# Patient Record
Sex: MALE | Race: AMERICAN INDIAN OR ALASKA NATIVE | Employment: FULL TIME | URBAN - NONMETROPOLITAN AREA
[De-identification: names, ages, dates, MRNs, and addresses within clinical notes are randomized per-mention and may not be internally consistent; named-entity substitution may affect disease eponyms.]

---

## 2017-03-31 ENCOUNTER — OFFICE VISIT - GICH (OUTPATIENT)
Dept: FAMILY MEDICINE | Facility: OTHER | Age: 42
End: 2017-03-31

## 2017-03-31 ENCOUNTER — HISTORY (OUTPATIENT)
Dept: FAMILY MEDICINE | Facility: OTHER | Age: 42
End: 2017-03-31

## 2017-03-31 DIAGNOSIS — Z00.00 ENCOUNTER FOR GENERAL ADULT MEDICAL EXAMINATION WITHOUT ABNORMAL FINDINGS: ICD-10-CM

## 2017-03-31 DIAGNOSIS — E03.8 OTHER SPECIFIED HYPOTHYROIDISM: ICD-10-CM

## 2017-03-31 DIAGNOSIS — I10 ESSENTIAL (PRIMARY) HYPERTENSION: ICD-10-CM

## 2017-03-31 DIAGNOSIS — F15.10 OTHER STIMULANT ABUSE, UNCOMPLICATED (H): ICD-10-CM

## 2017-03-31 DIAGNOSIS — F32.1 MAJOR DEPRESSIVE DISORDER, SINGLE EPISODE, MODERATE (H): ICD-10-CM

## 2017-03-31 DIAGNOSIS — E11.65 TYPE 2 DIABETES MELLITUS WITH HYPERGLYCEMIA (H): ICD-10-CM

## 2017-03-31 LAB
ANION GAP - HISTORICAL: 9 (ref 5–18)
BUN SERPL-MCNC: 14 MG/DL (ref 7–25)
BUN/CREAT RATIO - HISTORICAL: 16
CALCIUM SERPL-MCNC: 9.1 MG/DL (ref 8.6–10.3)
CHLORIDE SERPLBLD-SCNC: 102 MMOL/L (ref 98–107)
CO2 SERPL-SCNC: 26 MMOL/L (ref 21–31)
CREAT SERPL-MCNC: 0.87 MG/DL (ref 0.7–1.3)
ESTIMATED AVERAGE GLUCOSE: 223 MG/DL
GFR IF NOT AFRICAN AMERICAN - HISTORICAL: >60 ML/MIN/1.73M2
GLUCOSE SERPL-MCNC: 264 MG/DL (ref 70–105)
HEMOGLOBIN A1C MONITORING (POCT) - HISTORICAL: 9.4 % (ref 4–6.2)
POTASSIUM SERPL-SCNC: 4.1 MMOL/L (ref 3.5–5.1)
SODIUM SERPL-SCNC: 137 MMOL/L (ref 133–143)
TSH - HISTORICAL: 3.59 UIU/ML (ref 0.34–5.6)

## 2017-03-31 ASSESSMENT — PATIENT HEALTH QUESTIONNAIRE - PHQ9: SUM OF ALL RESPONSES TO PHQ QUESTIONS 1-9: 14

## 2017-04-03 ENCOUNTER — COMMUNICATION - GICH (OUTPATIENT)
Dept: FAMILY MEDICINE | Facility: OTHER | Age: 42
End: 2017-04-03

## 2017-04-03 ENCOUNTER — COMMUNICATION - GICH (OUTPATIENT)
Dept: INTERNAL MEDICINE | Facility: OTHER | Age: 42
End: 2017-04-03

## 2017-04-03 DIAGNOSIS — E11.65 TYPE 2 DIABETES MELLITUS WITH HYPERGLYCEMIA (H): ICD-10-CM

## 2017-04-10 ENCOUNTER — COMMUNICATION - GICH (OUTPATIENT)
Dept: FAMILY MEDICINE | Facility: OTHER | Age: 42
End: 2017-04-10

## 2017-04-10 DIAGNOSIS — E11.65 TYPE 2 DIABETES MELLITUS WITH HYPERGLYCEMIA (H): ICD-10-CM

## 2017-04-10 DIAGNOSIS — Z79.4 LONG TERM CURRENT USE OF INSULIN (H): ICD-10-CM

## 2017-04-10 DIAGNOSIS — E11.8 TYPE 2 DIABETES MELLITUS WITH COMPLICATIONS (H): ICD-10-CM

## 2017-04-13 ENCOUNTER — AMBULATORY - GICH (OUTPATIENT)
Dept: EDUCATION SERVICES | Facility: OTHER | Age: 42
End: 2017-04-13

## 2017-04-13 DIAGNOSIS — Z79.4 LONG TERM CURRENT USE OF INSULIN (H): ICD-10-CM

## 2017-04-13 DIAGNOSIS — E11.65 TYPE 2 DIABETES MELLITUS WITH HYPERGLYCEMIA (H): ICD-10-CM

## 2017-04-13 DIAGNOSIS — E11.8 TYPE 2 DIABETES MELLITUS WITH COMPLICATIONS (H): ICD-10-CM

## 2017-04-28 ENCOUNTER — HISTORY (OUTPATIENT)
Dept: EMERGENCY MEDICINE | Facility: OTHER | Age: 42
End: 2017-04-28

## 2017-05-18 ENCOUNTER — COMMUNICATION - GICH (OUTPATIENT)
Dept: FAMILY MEDICINE | Facility: OTHER | Age: 42
End: 2017-05-18

## 2017-05-18 ENCOUNTER — COMMUNICATION - GICH (OUTPATIENT)
Dept: PEDIATRICS | Facility: OTHER | Age: 42
End: 2017-05-18

## 2017-05-18 DIAGNOSIS — E11.65 TYPE 2 DIABETES MELLITUS WITH HYPERGLYCEMIA (H): ICD-10-CM

## 2017-06-15 ENCOUNTER — AMBULATORY - GICH (OUTPATIENT)
Dept: FAMILY MEDICINE | Facility: OTHER | Age: 42
End: 2017-06-15

## 2017-06-15 DIAGNOSIS — E11.65 TYPE 2 DIABETES MELLITUS WITH HYPERGLYCEMIA (H): ICD-10-CM

## 2017-06-23 ENCOUNTER — OFFICE VISIT - GICH (OUTPATIENT)
Dept: PEDIATRICS | Facility: OTHER | Age: 42
End: 2017-06-23

## 2017-06-23 ENCOUNTER — HISTORY (OUTPATIENT)
Dept: PEDIATRICS | Facility: OTHER | Age: 42
End: 2017-06-23

## 2017-06-23 DIAGNOSIS — Z79.4 LONG TERM CURRENT USE OF INSULIN (H): ICD-10-CM

## 2017-06-23 DIAGNOSIS — F15.10 OTHER STIMULANT ABUSE, UNCOMPLICATED (H): ICD-10-CM

## 2017-06-23 DIAGNOSIS — M77.11 LATERAL EPICONDYLITIS OF RIGHT ELBOW: ICD-10-CM

## 2017-06-23 DIAGNOSIS — E11.65 TYPE 2 DIABETES MELLITUS WITH HYPERGLYCEMIA (H): ICD-10-CM

## 2017-06-23 DIAGNOSIS — E11.8 TYPE 2 DIABETES MELLITUS WITH COMPLICATIONS (H): ICD-10-CM

## 2017-06-23 ASSESSMENT — ANXIETY QUESTIONNAIRES
6. BECOMING EASILY ANNOYED OR IRRITABLE: MORE THAN HALF THE DAYS
GAD7 TOTAL SCORE: 14
5. BEING SO RESTLESS THAT IT IS HARD TO SIT STILL: MORE THAN HALF THE DAYS
4. TROUBLE RELAXING: SEVERAL DAYS
2. NOT BEING ABLE TO STOP OR CONTROL WORRYING: MORE THAN HALF THE DAYS
3. WORRYING TOO MUCH ABOUT DIFFERENT THINGS: MORE THAN HALF THE DAYS
1. FEELING NERVOUS, ANXIOUS, OR ON EDGE: NEARLY EVERY DAY
7. FEELING AFRAID AS IF SOMETHING AWFUL MIGHT HAPPEN: MORE THAN HALF THE DAYS

## 2017-06-23 ASSESSMENT — PATIENT HEALTH QUESTIONNAIRE - PHQ9: SUM OF ALL RESPONSES TO PHQ QUESTIONS 1-9: 14

## 2017-06-28 ENCOUNTER — HOSPITAL ENCOUNTER (OUTPATIENT)
Dept: PHYSICAL THERAPY | Facility: OTHER | Age: 42
Setting detail: THERAPIES SERIES
End: 2017-06-28
Attending: INTERNAL MEDICINE

## 2017-09-07 ENCOUNTER — COMMUNICATION - GICH (OUTPATIENT)
Dept: PEDIATRICS | Facility: OTHER | Age: 42
End: 2017-09-07

## 2017-09-07 DIAGNOSIS — E11.8 TYPE 2 DIABETES MELLITUS WITH COMPLICATIONS (H): ICD-10-CM

## 2017-09-07 DIAGNOSIS — Z79.4 LONG TERM CURRENT USE OF INSULIN (H): ICD-10-CM

## 2017-09-07 DIAGNOSIS — E11.65 TYPE 2 DIABETES MELLITUS WITH HYPERGLYCEMIA (H): ICD-10-CM

## 2017-09-08 ENCOUNTER — AMBULATORY - GICH (OUTPATIENT)
Dept: LAB | Facility: OTHER | Age: 42
End: 2017-09-08

## 2017-09-08 DIAGNOSIS — E11.65 TYPE 2 DIABETES MELLITUS WITH HYPERGLYCEMIA (H): ICD-10-CM

## 2017-09-08 DIAGNOSIS — Z79.4 LONG TERM CURRENT USE OF INSULIN (H): ICD-10-CM

## 2017-09-08 DIAGNOSIS — E11.8 TYPE 2 DIABETES MELLITUS WITH COMPLICATIONS (H): ICD-10-CM

## 2017-09-08 LAB
ANION GAP - HISTORICAL: 7 (ref 5–18)
BUN SERPL-MCNC: 13 MG/DL (ref 7–25)
BUN/CREAT RATIO - HISTORICAL: 14
CALCIUM SERPL-MCNC: 9.2 MG/DL (ref 8.6–10.3)
CHLORIDE SERPLBLD-SCNC: 105 MMOL/L (ref 98–107)
CHOL/HDL RATIO - HISTORICAL: 4.74
CHOLESTEROL TOTAL: 147 MG/DL
CO2 SERPL-SCNC: 24 MMOL/L (ref 21–31)
CREAT SERPL-MCNC: 0.93 MG/DL (ref 0.7–1.3)
ESTIMATED AVERAGE GLUCOSE: 177 MG/DL
GFR IF NOT AFRICAN AMERICAN - HISTORICAL: >60 ML/MIN/1.73M2
GLUCOSE SERPL-MCNC: 234 MG/DL (ref 70–105)
HDLC SERPL-MCNC: 31 MG/DL (ref 23–92)
HEMOGLOBIN A1C MONITORING (POCT) - HISTORICAL: 7.8 % (ref 4–6.2)
LDLC SERPL CALC-MCNC: 71 MG/DL
NON-HDL CHOLESTEROL - HISTORICAL: 116 MG/DL
PATIENT STATUS - HISTORICAL: ABNORMAL
POTASSIUM SERPL-SCNC: 3.9 MMOL/L (ref 3.5–5.1)
SODIUM SERPL-SCNC: 136 MMOL/L (ref 133–143)
TRIGL SERPL-MCNC: 227 MG/DL

## 2017-09-11 ENCOUNTER — OFFICE VISIT - GICH (OUTPATIENT)
Dept: PEDIATRICS | Facility: OTHER | Age: 42
End: 2017-09-11

## 2017-09-11 ENCOUNTER — HISTORY (OUTPATIENT)
Dept: PEDIATRICS | Facility: OTHER | Age: 42
End: 2017-09-11

## 2017-09-11 DIAGNOSIS — Z79.4 LONG TERM CURRENT USE OF INSULIN (H): ICD-10-CM

## 2017-09-11 DIAGNOSIS — M51.16 INTERVERTEBRAL DISC DISORDER WITH RADICULOPATHY OF LUMBAR REGION: ICD-10-CM

## 2017-09-11 DIAGNOSIS — E11.9 TYPE 2 DIABETES MELLITUS WITHOUT COMPLICATIONS (H): ICD-10-CM

## 2017-09-11 DIAGNOSIS — Z87.820 PERSONAL HISTORY OF TRAUMATIC BRAIN INJURY: ICD-10-CM

## 2017-09-11 DIAGNOSIS — I10 ESSENTIAL (PRIMARY) HYPERTENSION: ICD-10-CM

## 2017-09-11 DIAGNOSIS — Z72.0 TOBACCO USE: ICD-10-CM

## 2017-09-11 DIAGNOSIS — E66.01 MORBID (SEVERE) OBESITY DUE TO EXCESS CALORIES (H): ICD-10-CM

## 2017-09-11 DIAGNOSIS — N52.1 ERECTILE DYSFUNCTION DUE TO DISEASES CLASSIFIED ELSEWHERE: ICD-10-CM

## 2017-09-12 ENCOUNTER — AMBULATORY - GICH (OUTPATIENT)
Dept: SCHEDULING | Facility: OTHER | Age: 42
End: 2017-09-12

## 2017-09-22 ENCOUNTER — COMMUNICATION - GICH (OUTPATIENT)
Dept: INTERNAL MEDICINE | Facility: OTHER | Age: 42
End: 2017-09-22

## 2017-09-28 ENCOUNTER — OFFICE VISIT - GICH (OUTPATIENT)
Dept: PEDIATRICS | Facility: OTHER | Age: 42
End: 2017-09-28

## 2017-09-28 ENCOUNTER — HISTORY (OUTPATIENT)
Dept: PEDIATRICS | Facility: OTHER | Age: 42
End: 2017-09-28

## 2017-09-28 DIAGNOSIS — F09 MENTAL DISORDER DUE TO KNOWN PHYSIOLOGICAL CONDITION: ICD-10-CM

## 2017-09-28 DIAGNOSIS — N52.9 MALE ERECTILE DYSFUNCTION: ICD-10-CM

## 2017-09-28 DIAGNOSIS — Z23 ENCOUNTER FOR IMMUNIZATION: ICD-10-CM

## 2017-09-28 DIAGNOSIS — Z87.820 PERSONAL HISTORY OF TRAUMATIC BRAIN INJURY: ICD-10-CM

## 2017-09-28 DIAGNOSIS — G25.2 OTHER SPECIFIED FORMS OF TREMOR: ICD-10-CM

## 2017-09-28 ASSESSMENT — PATIENT HEALTH QUESTIONNAIRE - PHQ9: SUM OF ALL RESPONSES TO PHQ QUESTIONS 1-9: 9

## 2017-10-12 ENCOUNTER — HISTORY (OUTPATIENT)
Dept: UROLOGY | Facility: OTHER | Age: 42
End: 2017-10-12

## 2017-10-12 ENCOUNTER — OFFICE VISIT - GICH (OUTPATIENT)
Dept: UROLOGY | Facility: OTHER | Age: 42
End: 2017-10-12

## 2017-10-12 DIAGNOSIS — N52.9 MALE ERECTILE DYSFUNCTION: ICD-10-CM

## 2017-12-08 ENCOUNTER — AMBULATORY - GICH (OUTPATIENT)
Dept: SCHEDULING | Facility: OTHER | Age: 42
End: 2017-12-08
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
## 2017-12-28 NOTE — TELEPHONE ENCOUNTER
Patient Information     Patient Name MRN Sex Concha Choi [id number] Male 1975      Telephone Encounter by Kathryn Jc at 2017 11:44 AM     Author:  Kathryn Jc Service:  (none) Author Type:  (none)     Filed:  2017 11:49 AM Encounter Date:  2017 Status:  Signed     :  Kathryn Jc            Patient called stating that the pharmacy never received Revatio prescription, but was done and denied by insurance. Call placed to [facility] they have prescription and pt will have to pay [facility] for this.   Kathryn Jc LPN ....................  2017   11:47 AM

## 2017-12-28 NOTE — TELEPHONE ENCOUNTER
Patient Information     Patient Name MRN Sex Concha Choi [id number] Male 1975      Telephone Encounter by Roberto Wilcox MD at 2017  2:13 PM     Author:  Roberto Wilcox MD Service:  (none) Author Type:  Physician     Filed:  2017  2:13 PM Encounter Date:  2017 Status:  Signed     :  Roberto Wilcox MD (Physician)            Please call Mr. Shin and ask him to:   -- Schedule lab-only visit for fasting labs   -- Schedule diabetes office visit. Bring written blood sugar log book to the visit.    Signed, Roberto Wilcox MD  Internal Medicine & Pediatrics

## 2017-12-28 NOTE — TELEPHONE ENCOUNTER
Patient Information     Patient Name MRN Sex Concha Choi [id number] Male 1975      Telephone Encounter by Kathryn Jc at 2017  2:27 PM     Author:  Kathryn Jc Service:  (none) Author Type:  (none)     Filed:  2017  2:30 PM Encounter Date:  2017 Status:  Signed     :  Kathryn Jc            Patient has appt on 17 for back and will bring blood sugar logs with at that time.  Kathryn Jc LPN ....................  2017   2:30 PM

## 2017-12-28 NOTE — PATIENT INSTRUCTIONS
Patient Information     Patient Name MRN Sex Concha Choi [id number] Male 1975      Patient Instructions by Roberto Wilcox MD at 2017 11:15 AM     Author:  Roberto Wilcox MD  Service:  (none) Author Type:  Physician     Filed:  2017 11:39 AM  Encounter Date:  2017 Status:  Addendum     :  Roberto Wilcox MD (Physician)        Related Notes: Original Note by Roberto Wilcox MD (Physician) filed at 2017 11:37 AM             -- Tylenol 1000 mg (2 extra strength tablets) 3 times per day scheduled   -- No other sources of Tylenol/acetaminophen/APAP, as this can affect your liver   -- Ibuprofen 600 mg (3 tablets) 3 times per day as needed   -- Take ibuprofen with food, as can be hard on the stomach   -- Rest   -- Ice/heat whichever feels better   -- Topicals: capsaicin cream, lidocaine, Aspercreme/IcyHot   -- Tennis elbow band   -- Schedule visit for occupational therapy   -- Call or come back if concerns, else as needed     -- Diabetes team consult  as planned

## 2017-12-28 NOTE — PATIENT INSTRUCTIONS
Patient Information     Patient Name MRN Sex Concha Choi [id number] Male 1975      Patient Instructions by Roberto Wilcox MD at 2017  8:15 AM     Author:  Roberto Wilcox MD  Service:  (none) Author Type:  Physician     Filed:  2017  8:45 AM  Encounter Date:  2017 Status:  Addendum     :  Roberto Wilcox MD (Physician)        Related Notes: Original Note by Roberto Wilcox MD (Physician) filed at 2017  8:40 AM            Aspects of Diabetes we can improve:  Hemoglobin A1c Lab Results   Component Value Date/Time    HGBA1C 7.8 (H) 2017 08:17 AM    HGBA1C 8.6 (H) 2013 09:10 AM    Goal range is under 8. Best is 6.5 to 7   Blood Pressure BP Readings from Last 1 Encounters:   17 120/82    Goal to keep less than 140/90   Tobacco  reports that he has been smoking Cigarettes.  He has a 5.00 pack-year smoking history. He has never used smokeless tobacco. Goal to abstain from tobacco   Aspirin Restart aspirin Aspirin reduces risk of heart disease and stroke   ACE/ARB losartan These medications reduce risk of kidney disease   Cholesterol simvastatin Statins reduce risk of heart disease and stroke   Eye Exam Up to date Annual diabetic eye exam   Healthy weight Body mass index is 40.11 kg/(m^2). Goal BMI under 30, best is under 25.      -- I'm trying to exercise daily (goal at least 20 min/day) with moderate aerobic activity   -- Eat healthy (resources from ADA at http://www.diabetes.org/)   -- I'm taking good care of my feet. Consider seeing the Podiatrist   -- Check blood sugars as directed, record in log book and bring to every appointment   -- Increase Lantus to 50 units   -- Increase Novolog to 22 units + correction scale   -- Next diabetes lab draw: 3 months   -- Next diabetes office visit: 3 months       -- Choose a quit date (within 1 month). Quitting smoking abruptly is more successful than gradually cutting back.   -- Tell everyone about it (friends,  family, coworkers)   -- Think about when you smoke the most, and what you'll do during those times (eg when in the car, work breaks, etc)     -- Start bupropion (Wellbutrin/Zyban) 1 week before your quit date -- Welbutrin 1 pill daily for 1 week then 1 pill twice daily     -- Stop smoking on quit date   -- Starting with quit date, use nicotine lozenges/gum as needed for cravings   -- Quit Plan   [phone] ([phone])   http://www.quitplan.com   -- http://smokefree.gov/

## 2017-12-28 NOTE — PROGRESS NOTES
Patient Information     Patient Name MRN Concha Cardenas [id number] Male 1975      Progress Notes by Aldo Jane MD at 10/12/2017  9:30 AM     Author:  Aldo Jane MD Service:  (none) Author Type:  Physician     Filed:  10/12/2017 10:43 AM Encounter Date:  10/12/2017 Status:  Signed     :  Aldo Jane MD (Physician)            IType of Visit  NPV    Chief Complaint  Erectile dysfunction    HPI  Mr. Shin is a 41 y.o. male with history of metabolic syndrome including diabetes who presents with erectile dysfunction.    His ED issues started about 4-5 years ago.  He has tried sildenafil 40 mg in the past.  Currently he is using sildenafil 40 mg.  He rates his erectile rigidity as 6/10 with treatment.    He reports taking the previous PDE5 inhibitors correctly  He does not take oral nitrates for chest pain.      Past Medical History  He  has a past medical history of DIABETES MELLITUS, TYPE II (2011); Hypertension; Hypothyroid; and Narcotic dependence -- No Further Narcotics will be prescribed by Dr. Haywood. Failed UTOX with unclaimed/prescribed Oxycodone 2013 (2013).  Patient Active Problem List     Diagnosis  Code     EPICONDYLITIS M77.10     BACK PAIN, LUMBAR M54.5     Lumbar disc disease with radiculopathy M51.16     Hypertension I10     Tobacco use Z72.0     Major depression F32.9     Hyperlipidemia E78.5     Narcotic dependence -- No Further Narcotics will be prescribed by Dr. Haywood. Failed UTOX with unclaimed/prescribed Oxycodone 2013      Obesity E66.9     Narcotic dependence (HC) F11.20     MDD (major depressive disorder) F32.9     Hypothyroidism E03.9     Substance abuse F19.10     History of drug overdose Z91.89     Methamphetamine abuse F15.10     Controlled type 2 diabetes mellitus without complication, with long-term current use of insulin (HC) E11.9, Z79.4     History of traumatic brain injury Z87.820       Past Surgical History  He  has a past surgical  history that includes knee arthroscopy (2011).    Medications  He has a current medication list which includes the following prescription(s): acetaminophen, aspirin, blood sugar diagnostic, blood-glucose meter, bupropion, diclofenac 1 % topical, proxy dme, fluoxetine, ibuprofen, insulin aspart, insulin glargine, insulin needles (disposable), lamotrigine, lancets, losartan, nicotine, sildenafil, simvastatin, strattera, and triamcinolone.    Allergies  Allergies     Allergen  Reactions     Lisinopril Rash     Trazodone Tachycardia       Social History  He  reports that he has been smoking Cigarettes.  He has a 5.00 pack-year smoking history. He has never used smokeless tobacco. He reports that he does not drink alcohol or use illicit drugs.  No drug abuse.    Family History  Family History       Problem   Relation Age of Onset     Cancer  Father      Throat Cancer in Family       Diabetes  Father      Hypertension  Father      Hyperlipidemia  Father      Stroke  Father      Diabetes  Sister      Heart Disease  Other      Early FHx of CAD        Diabetes  Maternal Grandmother      Heart Disease  Maternal Grandmother      Diabetes  Maternal Grandfather      Diabetes  Paternal Grandmother      Diabetes  Paternal Grandfather      Blood Disease  No Family History      Cancer-prostate  No Family History      Cancer-colon  No Family History      Thyroid Disease  No Family History        Review of Systems  I personally reviewed the ROS with the patient.    Nursing Notes:   Henny Mena  10/12/2017  9:21 AM  Signed  Patient is here today for a consult, having ED problems  Review of Systems:    Weight loss:    No     Recent fever/chills:  No   Night sweats:   No  Current skin rash:  Yes   Recent hair loss:  No  Heat intolerance:  No   Cold intolerance:  No  Chest pain:   No   Palpitations:   No  Shortness of breath:  No   Wheezing:   Yes  Constipation:    No   Diarrhea:   No   Nausea:   No   Vomiting:   No   Kidney/side  "pain:  No   Back pain:   No  Frequent headaches:  No   Dizziness:     No  Leg swelling:   No   Calf pain:    No    Parents, brothers or sisters with history of kidney cancer?   No  Parents, brothers or sisters with history of bladder cancer: No    Henny Mena LPN 10/12/2017 9:13 AM      Physical Exam  Vitals:     10/12/17 0914   BP: 128/80   Pulse: 75   Weight: 129.7 kg (286 lb)   Height: 1.803 m (5' 11\")     Constitutional: No acute distress.  Alert and cooperative   Head: NCAT  Eyes: Conjunctivae normal  Cardiovascular: Regular rate  Pulmonary/Chest: Respirations are even and non-labored bilaterally, no audible wheezing  Abdominal: Soft. No distension, tenderness, masses or guarding.   Neurological: A + O x 3.  Cranial Nerves II-XII grossly intact.  Extremities: [place] x 4, Warm. No clubbing.  No cyanosis.    Skin: Pink, warm and dry.  No visible rashes noted.  Psychiatric:  Normal mood and affect  Back:  No left CVA tenderness.  No right CVA tenderness.  Genitourinary:  Nonpalpable bladder  Normal male phallus without discharge or lesions, normal pubic hair distribution.  Testicles descended bilaterally.     Labs  CREATININE (mg/dL)    Date Value   09/08/2017 0.93       Assessment  Mr. Shin is a 41 y.o. male with erectile dysfunction.  Explained that he's been taking subtherapeutic doses of sildenafil.  We discussed treatment options including oral PDE5- medication, Trimix injections, vacuum erection devices and implantable penile prostheses.    I explained that he will likely experience disease progression later in life and may require injections if and when oral medications no longer work.    Plan  I recommended increasing activity level and weight loss which would overall slow the progression of his erectile dysfunction disease potentially even improvement  Start sildenafil 20mg, take 3-5 (60-100mg) tabs 1 hour prior to sexual activity  He will prefer to follow up as needed        "

## 2017-12-30 NOTE — NURSING NOTE
Patient Information     Patient Name MRN Sex Concha Choi [id number] Male 1975      Nursing Note by Henny Mena at 10/12/2017  9:30 AM     Author:  Henny Mena Service:  (none) Author Type:  (none)     Filed:  10/12/2017  9:21 AM Encounter Date:  10/12/2017 Status:  Signed     :  Henny Mena            Patient is here today for a consult, having ED problems  Review of Systems:    Weight loss:    No     Recent fever/chills:  No   Night sweats:   No  Current skin rash:  Yes   Recent hair loss:  No  Heat intolerance:  No   Cold intolerance:  No  Chest pain:   No   Palpitations:   No  Shortness of breath:  No   Wheezing:   Yes  Constipation:    No   Diarrhea:   No   Nausea:   No   Vomiting:   No   Kidney/side pain:  No   Back pain:   No  Frequent headaches:  No   Dizziness:     No  Leg swelling:   No   Calf pain:    No    Parents, brothers or sisters with history of kidney cancer?   No  Parents, brothers or sisters with history of bladder cancer: No    Henny Mena LPN 10/12/2017 9:13 AM

## 2017-12-30 NOTE — NURSING NOTE
Patient Information     Patient Name MRN Sex Concha Choi [id number] Male 1975      Nursing Note by Kathryn Jc at 2017 11:15 AM     Author:  Kathryn Jc Service:  (none) Author Type:  (none)     Filed:  2017 11:32 AM Encounter Date:  2017 Status:  Signed     :  Kathryn Jc            Patient presents to clinic for right arm pain that started last November.  States that the arm hurts from the elbow down.  Kathryn Jc LPN ....................  2017   11:24 AM

## 2017-12-30 NOTE — NURSING NOTE
Patient Information     Patient Name MRN Sex Concha Choi [id number] Male 1975      Nursing Note by Tanya Mishra at 2017  8:15 AM     Author:  Tanya Mishra Service:  (none) Author Type:  (none)     Filed:  2017  8:29 AM Encounter Date:  2017 Status:  Signed     :  Tanya Mishra            Previous A1C is at goal of <8  HEMOGLOBIN A1C MONITORING (POCT)    Date Value   2017 7.8 % (H)   2013 8.6 % Total Hgb (H)           Patient is a current smoker  Patient is not on a daily aspirin  Patient is not on a Statin.  Blood pressure today of 120/82 is at the goal of <139/89 for diabetics.    Tanya Mishra LPN..............2017 8:26 AM

## 2017-12-30 NOTE — NURSING NOTE
Patient Information     Patient Name MRN Sex Concha Choi [id number] Male 1975      Nursing Note by Kathryn Jc at 2017  2:45 PM     Author:  Kathryn Jc Service:  (none) Author Type:  (none)     Filed:  2017  3:06 PM Encounter Date:  2017 Status:  Signed     :  Kathryn Jc            Patient presents to clinic for referral to see neurology.  Does not care where.  Kathryn Jc LPN ....................  2017   2:55 PM

## 2018-01-03 ENCOUNTER — HISTORY (OUTPATIENT)
Dept: PEDIATRICS | Facility: OTHER | Age: 43
End: 2018-01-03

## 2018-01-03 ENCOUNTER — OFFICE VISIT - GICH (OUTPATIENT)
Dept: PEDIATRICS | Facility: OTHER | Age: 43
End: 2018-01-03

## 2018-01-03 DIAGNOSIS — F15.10 OTHER STIMULANT ABUSE, UNCOMPLICATED (H): ICD-10-CM

## 2018-01-03 DIAGNOSIS — E11.9 TYPE 2 DIABETES MELLITUS WITHOUT COMPLICATIONS (H): ICD-10-CM

## 2018-01-03 DIAGNOSIS — R21 RASH AND OTHER NONSPECIFIC SKIN ERUPTION: ICD-10-CM

## 2018-01-03 DIAGNOSIS — F33.2 MAJOR DEPRESSIVE DISORDER, RECURRENT SEVERE WITHOUT PSYCHOTIC FEATURES (H): ICD-10-CM

## 2018-01-03 DIAGNOSIS — Z79.4 LONG TERM CURRENT USE OF INSULIN (H): ICD-10-CM

## 2018-01-03 DIAGNOSIS — T14.8XXA OTHER INJURY OF UNSPECIFIED BODY REGION, INITIAL ENCOUNTER (CODE): ICD-10-CM

## 2018-01-03 ASSESSMENT — PATIENT HEALTH QUESTIONNAIRE - PHQ9: SUM OF ALL RESPONSES TO PHQ QUESTIONS 1-9: 22

## 2018-01-04 NOTE — PROGRESS NOTES
Patient Information     Patient Name MRN Sex Concha Choi [id number] Male 1975      Progress Notes by Chidi Wetzel MD at 3/31/2017  2:45 PM     Author:  Chidi Wetzel MD Service:  (none) Author Type:  Physician     Filed:  3/31/2017  3:24 PM Encounter Date:  3/31/2017 Status:  Signed     :  Chidi Wetzel MD (Physician)            SUBJECTIVE:    Concha Shin is a 41 y.o. male who presents for [facility] physical    HPI Comments: Patient arrives here for a [facility] physical. He recently violated probation by using methamphetamines. He has a history of narcotic dependence history of drug overdose substance abuse violation of narcotic contract. Patient also has a history of diabetes and was recently transferred from the prison to [facility]. The did start him on a 70/30% insulin mix but patient wants to go back to his hands including NovoLog and Lantus. Patient also needs is a glucose meter. Diabetic strips. Lancets. States he hasn't been using his insulin or any medications for over a year. Patient does have a history of hypo-thyroidism and has not been using Synthroid. His diabetes in the past has been uncontrollable. He is basically noncompliant.       Allergies     Allergen  Reactions     Trazodone Tachycardia   ,   Family History       Problem   Relation Age of Onset     Heart Disease        Early FHx of CAD        Cancer  Father      Throat Cancer in Family       Diabetes  Father      Hypertension  Father      Hyperlipidemia  Father      Stroke  Father      Diabetes  Sister      Diabetes  Maternal Grandmother      Heart Disease  Maternal Grandmother      Diabetes  Maternal Grandfather      Diabetes  Paternal Grandmother      Diabetes  Paternal Grandfather      Blood Disease  No Family History      Cancer-prostate  No Family History      Cancer-colon  No Family History      Thyroid Disease  No Family History    ,   Current Outpatient Prescriptions on File Prior to Visit      "  Medication  Sig Dispense Refill     blood sugar diagnostic (ACCU-CHEK SMARTVIEW TEST STRIP) strip Check blood sugar 4 times daily .02 400 Each 3     No current facility-administered medications on file prior to visit.    ,   Past Surgical History       Procedure   Laterality Date     Knee arthroscopy   2011     Left     ,   Social History      Substance Use Topics        Smoking status:  Former Smoker     Packs/day: 0.50     Years: 20.00     Types: Cigarettes     Smokeless tobacco:  Never Used     Alcohol use  No    and   Social History      Substance Use Topics        Smoking status:  Former Smoker     Packs/day: 0.50     Years: 20.00     Types: Cigarettes     Smokeless tobacco:  Never Used     Alcohol use  No       REVIEW OF SYSTEMS:  Review of Systems   Constitutional: Negative for chills and fever.   Respiratory: Negative for cough.    Cardiovascular: Negative for chest pain.       OBJECTIVE:  /100  Pulse 76  Ht 1.791 m (5' 10.5\")  Wt 117.6 kg (259 lb 3.2 oz)  BMI 36.67 kg/m2    EXAM:   Physical Exam   Constitutional: He is well-developed, well-nourished, and in no distress.   HENT:   Head: Normocephalic and atraumatic.   Right Ear: External ear normal.   Left Ear: External ear normal.   Mouth/Throat: No oropharyngeal exudate.   Eyes: Pupils are equal, round, and reactive to light.   Cardiovascular: Normal rate, regular rhythm and normal heart sounds.    No murmur heard.  Pulmonary/Chest: Breath sounds normal. No respiratory distress. He has no wheezes.   Abdominal: Soft.   Musculoskeletal: Normal range of motion.   Neurological: He is alert.       ASSESSMENT/PLAN:    ICD-10-CM    1. Physical exam Z00.00    2. Methamphetamine abuse F15.10    3. Hypertension I10 losartan (COZAAR) 50 mg tablet   4. Other specified hypothyroidism E03.8    5. Uncontrolled diabetes mellitus type 2 without complications, unspecified long term insulin use status E11.65 blood-glucose meter      Insulin Needles, " "Disposable, (CYNTHIA PEN NEEDLE) 32 gauge x 5/32\"      lancets (ACCU-CHEK FASTCLIX)      insulin aspart (NOVOLOG FLEXPEN) 100 unit/mL solution for injection      insulin glargine (LANTUS SOLOSTAR) 100 unit/mL (3 mL) pen      TSH      HEMOGLOBIN A1C MONITORING (POCT)      BASIC METABOLIC PANEL      TSH      HEMOGLOBIN A1C MONITORING (POCT)      BASIC METABOLIC PANEL   6. Moderate single current episode of major depressive disorder (HC) F32.1 FLUoxetine (PROZAC) 20 mg capsule        Plan:  Laboratory pending including a TSH and A1c.  Restart his NovoLog and Lantus. Discontinue the 70/30% mixed.  Blood pressure currently not under good control and restart the Cozaar  continue the Prozac for his depression.  We'll get back to patient when labs return.        "

## 2018-01-04 NOTE — PATIENT INSTRUCTIONS
Patient Information     Patient Name MRN Sex Concha Choi [id number] Male 1975      Patient Instructions by Iva Mckay NP at 2017 12:30 PM     Author:  Iva Mckay NP  Service:  (none) Author Type:  PHYS- Nurse Practitioner     Filed:  2017  1:10 PM  Encounter Date:  2017 Status:  Addendum     :  Iva Mckay NP (PHYS- Nurse Practitioner)        Related Notes: Original Note by Iva Mckay NP (PHYS- Nurse Practitioner) filed at 2017 12:30 PM            Increase Lantus to 34 units at bedtime  Increase Novolog 13 units three times a day before meals  Stop current sliding scale.   Start new sliding scale:    NovoLOG SLIDING SCALE for correction of high blood sugars at mealtime.    Blood Glucose       Units of Novolog insulin    150  to  199 = 2 units of extra insulin    200  to  249 = 4 units of extra insulin    250  to  299 = 6 units of extra insulin    300  to  349 = 8 units of extra insulin    Equal to or greater than 350 =   10 units of extra insulin    Please fax blood sugars to Iva Mckay NP in one week. Please fax to [phone].     May recheck A1c early July.    Try to stay within 4 carb choices or 60 grams carb with meals. Avoid high sugar beverages.       If low blood sugar or feeling symptoms of low blood sugar (less than 70), check blood sugar and eat 15 gram of simple carb. Examples: 1/2 cup juice, 1/2 cup regular pop, 3-4 glucose tablets, fruit snacks.       Diabetes is a progressive disease and takes a lot of work and dedication on a daily basis to keep in good control. I am here to help support you during this process.       Check blood sugar 4 times a day  Before breakfast, before lunch, before supper, and bedtime    Record in log book and bring glucometer and log book to every clinic visit    Watch carb intake/portions 2-4 carb choices (30-60 gm) at each meal three times a day and 0-1 carb choices (0-15 gm) for snacks between  "meals. If desire to lose weight, try to stay closer to lower end of carb choices at mealtimes and no snacks.    Activity recommendations: 150 min/week      Recommendations    To best take care of your self with diabetes, I recommend the followin. Diabetes can affect your eyes/vision: please schedule annual dilated eye exams with your eye doctor    2. Diabetes can increase your risk of cavities, gum disease and tooth/bone loss: please schedule routine dental checks and regular flossing/brushing    3. Diabetes can affect the nerves in your body, especially the long nerve down to your feet. Please routinely check feet to ensure skin intact, no persistant reddened areas, and no sores that are not healing or may be infected. If you see a sore or reddened area that is concerning to you, please see your provider right away.     4. I also recommend good blood pressure (less than 140/90), \"bad\" cholesterol LDL less than 100, A1c less than 7, take a daily aspirin if your provider recommends, and no tobacco use.     5. On a routine basis, your provider will check labs to make sure kidneys are working properly, check cholesterol levels, and average blood sugar (A1c).    6. Diabetes increases your risk for stroke and heart attack. I recommend trying to keep blood sugar in good control by reaching the goals stated below.     Goals  Fasting and premeal:   2 hours after the start of a meal: less than 180  Bedtime: 100-140  A1c: less than 7          "

## 2018-01-04 NOTE — TELEPHONE ENCOUNTER
Patient Information     Patient Name MRN Sex Concha Choi [id number] Male 1975      Telephone Encounter by Dariana Upton RN at 4/3/2017  8:31 AM     Author:  Dariana Upton RN Service:  (none) Author Type:  NURS- Registered Nurse     Filed:  4/3/2017  8:34 AM Encounter Date:  4/3/2017 Status:  Signed     :  Dariana Upton RN (NURS- Registered Nurse)            Diabetic Supplies    Office visit in the past 12 months.    Last visit with SANTO ZHU was on: 2017 in [facility]  Next visit with SANTO ZHU is on: No future appointment listed with this provider  Next visit with Family Practice is on: No future appointment listed in this department    Max refill for 12 months from last office visit.  Always add ICD-10 code.    Prescription refilled per RN Medication Refill Policy.................... Dariana Upton RN ....................  4/3/2017   8:34 AM

## 2018-01-04 NOTE — TELEPHONE ENCOUNTER
Patient Information     Patient Name MRN Sex Concha Choi [id number] Male 1975      Telephone Encounter by Deepti Maldonado at 4/10/2017  9:15 AM     Author:  Deepti Maldonado Service:  (none) Author Type:  (none)     Filed:  4/10/2017  9:17 AM Encounter Date:  4/10/2017 Status:  Signed     :  Deepti Maldonado            [facility] STATES THAT PATIENT'S NUMBERS HAVE BEEN RUNNING HIGH AND SHE WOULD LIKE A REFERRAL FOR PATIENT TO BE SEEN FOR DIABETES.    Deepti Maldonado ....................  4/10/2017   9:16 AM

## 2018-01-04 NOTE — PROGRESS NOTES
Patient Information     Patient Name MRN Sex Concha Choi [id number] Male 1975      Progress Notes by Iva Mckay NP at 2017 12:30 PM     Author:  Iva Mckay NP Service:  (none) Author Type:  PHYS- Nurse Practitioner     Filed:  2017  1:42 PM Encounter Date:  2017 Status:  Signed     :  Iva Mckay NP (PHYS- Nurse Practitioner)            Subjective:  Pt presents to clinic today for diabetes type 2, education and medication management. Pt is currently at [facility] ([facility]) for rehab. He is checking blood sugars 4x/day and I have reviewed blood sugars for past month from [facility]. Currently taking Lantus 30 units and Novolog 10 units plus low dose sliding scale before meals. All blood sugars continue to be in low to mid 200's, some in 300's. Three times in past 6 weeks, blood sugars were in 170-190's. Sounds like pt is eating a lot of carbs and a carb bedtime snack as meals are offered at [facility]. Pt has been taking 12-13 units with almost every meal and continues to run high. He denies hypoglycemia.     He is not able to be active due to back pain.     Discussed D5 Health Goals and patient has met 1 of 5 goals at this time.  (BP less than 140/90, ASA therapy as recommended, LDL less than 100, A1c less than 8%, tobacco free).  Recommend aspirin 81 mg daily, recommend statin therapy, goal A1c less than 7, smoking cessation.         Past Medical History:     Diagnosis  Date     DIABETES MELLITUS, TYPE II 2011     Hypertension      Hypothyroid      Narcotic dependence -- No Further Narcotics will be prescribed by Dr. Haywood. Failed UTOX with unclaimed/prescribed Oxycodone 2013       Past Surgical History:      Procedure  Laterality Date     KNEE ARTHROSCOPY      Left         Trazodone    Current Outpatient Prescriptions on File Prior to Visit       Medication  Sig Dispense Refill     blood sugar diagnostic (ACCU-CHEK SMARTVIEW TEST  "STRIP) strip Check blood sugar 4 times daily, Reason: uncontrolled diabetes E11.65 400 Each 3     blood-glucose meter Dispense item covered by pt ins. Glucose meter and testing suppies 1 Device 0     FLUoxetine (PROZAC) 20 mg capsule Take 1 capsule by mouth every morning. 30 capsule 5     insulin aspart (NOVOLOG FLEXPEN) 100 unit/mL solution for injection Inject 10 Units subcutaneous 3 times daily before meals. and 1 unit for 150-199, 2 units for 200-249, 3 units for 250-299, 4 units for > 300 4 pen 11     insulin glargine (LANTUS SOLOSTAR) 100 unit/mL (3 mL) pen Inject 30 Units subcutaneous before bedtime. 1 box 8     Insulin Needles, Disposable, (CYNTHIA PEN NEEDLE) 32 gauge x 5/32\" As directed. For administering insulin at home ICD E11.9 400 Each 2     lancets (ACCU-CHEK FASTCLIX) Check blood sugar 4 times daily .02 400 Each 3     losartan (COZAAR) 50 mg tablet Take 1 tablet by mouth once daily. 90 tablet 4     No current facility-administered medications on file prior to visit.        Family History       Problem   Relation Age of Onset     Heart Disease        Early FHx of CAD        Cancer  Father      Throat Cancer in Family       Diabetes  Father      Hypertension  Father      Hyperlipidemia  Father      Stroke  Father      Diabetes  Sister      Diabetes  Maternal Grandmother      Heart Disease  Maternal Grandmother      Diabetes  Maternal Grandfather      Diabetes  Paternal Grandmother      Diabetes  Paternal Grandfather      Blood Disease  No Family History      Cancer-prostate  No Family History      Cancer-colon  No Family History      Thyroid Disease  No Family History        Social History     Social History        Marital status:  Single     Spouse name: N/A     Number of children:  1     Years of education:  N/A     Occupational History        [employer]     Social History Main Topics         Smoking status:   Former Smoker     Packs/day:  0.50     Years:  20.00     Types:  Cigarettes     " Smokeless tobacco:   Never Used     Alcohol use   No     Drug use:   Not on file      Comment: opiate dependency      Sexual activity:   Not on file     Other Topics   Concern     Caffeine Concern  Yes     Coffee 3-4 cups daily      Exercise  No     Seat Belt  Yes     %100      Social History Narrative     Worked at [employer] End of May 2013.     Single    1 son    Lives with son, girlfriend, and his Aunt in [facility].     Previous health care through [facility] or [place].     Lost drivers license at about 21 years old.        Review of Systems:  General:  Denies weight changes  HEENT: Denies blurred vision  Respiratory: Denies difficulty breathing, SOB, cough  Cardiac: Denies chest pain, chest pressure  Gastrointestinal:  Denies abdominal pain, constipation, diarrhea  Neurologic: Denies parasthesias  Psychiatric: Denies depression and anxiety  Endocrine:  Denies polydipsia, polyphagia, polyuria         Objective: /65  HEMOGLOBIN A1C MONITORING (POCT)    Date Value   03/31/2017 9.4 % (H)   06/03/2013 8.6 % Total Hgb (H)     SODIUM      Date Value Ref Range Status   03/31/2017 137 133 - 143 mmol/L Final     POTASSIUM      Date Value Ref Range Status   03/31/2017 4.1 3.5 - 5.1 mmol/L Final     CHLORIDE      Date Value Ref Range Status   03/31/2017 102 98 - 107 mmol/L Final     CO2,TOTAL      Date Value Ref Range Status   03/31/2017 26 21 - 31 mmol/L Final     ANION GAP      Date Value Ref Range Status   03/31/2017 9 5 - 18                 Final     GLUCOSE      Date Value Ref Range Status   03/31/2017 264 (H) 70 - 105 mg/dL Final     BUN      Date Value Ref Range Status   03/31/2017 14 7 - 25 mg/dL Final     CREATININE      Date Value Ref Range Status   03/31/2017 0.87 0.70 - 1.30 mg/dL Final     BUN/CREAT RATIO                Date Value Ref Range Status   03/31/2017 16                 Final     CALCIUM      Date Value Ref Range Status   03/31/2017 9.1 8.6 - 10.3 mg/dL  Final     LDL CHOLESTEROL (mg/dL)    Date Value   09/22/2015 89     TRIGLYCERIDES (mg/dL)    Date Value   09/22/2015 252 (H)        Pleasant and alert without distress. Affect normal.   Skin color pink. Extremities without edema.   Gait is stable.       Assessment/Plan:  1. Diabetes type 2, uncontrolled   A. Increase Lantus 34 units at bedtime   B. Increase Novolog to 13 units TID before meals plus change to medium dose sliding scale  150 - 199...2 units; 200 - 249...4 units; 250 - 299 ...6 units; 300 - 349 ...8 units; 350 or greater...10 units    C. Recommend aspirin 81 mg daily and statin therapy.    D. Continue to check blood sugars 4x/day   E. Reviewed hypoglycemia protocol   F. Reviewed carbs and portions, wt loss, activity   G. Recommend eye exam--due now (last check was 1.5 years ago)   H. Please fax blood sugars to Iva in 1 week to review and adjust as necessary      A total of 39 minutes was spent with this patient, of which more than 50% was spent in counseling and/or coordination of care regarding diabetes type 2, reviewed chart and labs, reviewed blood sugars from [facility], reviewed hypoglycemia protocol, reviewed carbs and portion sizes, treatment plan.

## 2018-01-04 NOTE — NURSING NOTE
Patient Information     Patient Name MRN Sex Concha Choi [id number] Male 1975      Nursing Note by Katelynn Adam at 3/31/2017  2:45 PM     Author:  Katelynn Adam Service:  (none) Author Type:  (none)     Filed:  3/31/2017  2:51 PM Encounter Date:  3/31/2017 Status:  Signed     :  Katelynn Adam            Patient is at [facility] for 60-90 days for probation violation dirty UA  Positive for meth. He is on insulin , needs refill and new meter. Katelynn Adam LPN .......................3/31/2017  2:46 PM

## 2018-01-05 NOTE — TELEPHONE ENCOUNTER
Patient Information     Patient Name MRN Sex Concha Choi [id number] Male 1975      Telephone Encounter by Roberto Wilcox MD at 2017 12:08 PM     Author:  Roberto Wilcox MD Service:  (none) Author Type:  Physician     Filed:  2017 12:08 PM Encounter Date:  2017 Status:  Signed     :  Roberto Wilcox MD (Physician)            Please call Mr. Shin and ask him to:   -- Schedule diabetes office visit. Bring written blood sugar log book to the visit.    Signed, Roberto Wilcox MD  Internal Medicine & Pediatrics

## 2018-01-05 NOTE — TELEPHONE ENCOUNTER
Patient Information     Patient Name MRN Sex Concha Choi [id number] Male 1975      Telephone Encounter by Kathryn Jc at 2017 12:41 PM     Author:  Kathryn Jc Service:  (none) Author Type:  (none)     Filed:  2017 12:42 PM Encounter Date:  2017 Status:  Signed     :  Kathryn Jc            Phone number is not current.  Called and got a Tesfaye states he was given the [phone] phone number awhile ago and keeps getting phone calls for pt.  No current phone number for patient.  Old number was removed.  Kathryn Jc LPN ....................  2017   12:42 PM

## 2018-01-05 NOTE — TELEPHONE ENCOUNTER
Patient Information     Patient Name MRN Sex Concha Choi [id number] Male 1975      Telephone Encounter by Iva Mckay NP at 2017  2:35 PM     Author:  Iva Mckay NP Service:  (none) Author Type:  PHYS- Nurse Practitioner     Filed:  2017  2:40 PM Encounter Date:  2017 Status:  Signed     :  Iva Mckay NP (PHYS- Nurse Practitioner)            Spoke with nurse at [facility] re: Concha Shin's blood sugars.     Pt continues to be elevated in 200's sometimes 300s. Occasionally in 100's but rare.     Currently taking Lantus 34 units at bedtime, Novolog 13 units TID plus MDSS.     Pt continues to eat high carb meals despite education from myself and nursing staff at [facility].     Plan:  1. Increase Lantus 38 units at bedtime  2. Increase Novolog 15 units TID plus MDSS  3. In one week if fasting blood sugars remain over 150, then increase Lantus to 40 units  4. In one week if mealtime blood sugars are over 140, then increase to 16 units TID plus MDSS   5. Fax blood sugars to Iva Mckay NP in 2 weeks after adjustments.     Iav Mckay NP ....................  2017   2:39 PM

## 2018-01-25 ENCOUNTER — DOCUMENTATION ONLY (OUTPATIENT)
Dept: FAMILY MEDICINE | Facility: OTHER | Age: 43
End: 2018-01-25

## 2018-01-25 PROBLEM — F15.10 METHAMPHETAMINE ABUSE (H): Status: ACTIVE | Noted: 2017-03-31

## 2018-01-25 PROBLEM — Z79.4 CONTROLLED TYPE 2 DIABETES MELLITUS WITHOUT COMPLICATION, WITH LONG-TERM CURRENT USE OF INSULIN (H): Status: ACTIVE | Noted: 2017-09-11

## 2018-01-25 PROBLEM — Z87.820 HISTORY OF TRAUMATIC BRAIN INJURY: Status: ACTIVE | Noted: 2017-09-11

## 2018-01-25 PROBLEM — E11.9 CONTROLLED TYPE 2 DIABETES MELLITUS WITHOUT COMPLICATION, WITH LONG-TERM CURRENT USE OF INSULIN (H): Status: ACTIVE | Noted: 2017-09-11

## 2018-01-25 PROBLEM — I10 HYPERTENSION: Status: ACTIVE | Noted: 2018-01-25

## 2018-01-25 RX ORDER — ASPIRIN 81 MG/1
81 TABLET ORAL
COMMUNITY
Start: 2017-09-11

## 2018-01-25 RX ORDER — PEN NEEDLE, DIABETIC 30 GX3/16"
NEEDLE, DISPOSABLE MISCELLANEOUS
COMMUNITY
Start: 2017-03-31

## 2018-01-25 RX ORDER — BUPROPION HYDROCHLORIDE 150 MG/1
TABLET, EXTENDED RELEASE ORAL
COMMUNITY
Start: 2017-09-11

## 2018-01-25 RX ORDER — MUPIROCIN 20 MG/G
OINTMENT TOPICAL
COMMUNITY
Start: 2018-01-03

## 2018-01-25 RX ORDER — LANCETS
EACH MISCELLANEOUS
COMMUNITY
Start: 2017-03-31

## 2018-01-25 RX ORDER — TRIAMCINOLONE ACETONIDE 1 MG/G
OINTMENT TOPICAL 3 TIMES DAILY
COMMUNITY
Start: 2017-04-28

## 2018-01-25 RX ORDER — LAMOTRIGINE 100 MG/1
100 TABLET ORAL DAILY
COMMUNITY
Start: 2017-06-23

## 2018-01-25 RX ORDER — SIMVASTATIN 20 MG
20 TABLET ORAL AT BEDTIME
COMMUNITY
Start: 2017-09-11

## 2018-01-25 RX ORDER — SILDENAFIL CITRATE 20 MG/1
TABLET ORAL
COMMUNITY
Start: 2017-10-12

## 2018-01-25 RX ORDER — INSULIN PUMP SYRINGE, 3 ML
EACH MISCELLANEOUS
COMMUNITY
Start: 2017-03-31

## 2018-01-25 RX ORDER — ATOMOXETINE 60 MG/1
60 CAPSULE ORAL DAILY
COMMUNITY
Start: 2017-09-21

## 2018-01-25 RX ORDER — LOSARTAN POTASSIUM 50 MG/1
50 TABLET ORAL DAILY
COMMUNITY
Start: 2017-03-31

## 2018-01-25 RX ORDER — IBUPROFEN 200 MG
600 TABLET ORAL EVERY 6 HOURS PRN
COMMUNITY
Start: 2017-06-23

## 2018-01-25 RX ORDER — ACETAMINOPHEN 500 MG
1000 TABLET ORAL 3 TIMES DAILY PRN
COMMUNITY
Start: 2017-06-23

## 2018-01-26 VITALS
HEIGHT: 71 IN | SYSTOLIC BLOOD PRESSURE: 120 MMHG | WEIGHT: 286 LBS | HEART RATE: 90 BPM | BODY MASS INDEX: 40.04 KG/M2 | DIASTOLIC BLOOD PRESSURE: 84 MMHG

## 2018-01-26 VITALS
DIASTOLIC BLOOD PRESSURE: 80 MMHG | HEART RATE: 75 BPM | SYSTOLIC BLOOD PRESSURE: 128 MMHG | BODY MASS INDEX: 40.04 KG/M2 | HEIGHT: 71 IN | WEIGHT: 286 LBS

## 2018-01-26 VITALS
WEIGHT: 283 LBS | HEIGHT: 71 IN | BODY MASS INDEX: 39.62 KG/M2 | DIASTOLIC BLOOD PRESSURE: 80 MMHG | SYSTOLIC BLOOD PRESSURE: 108 MMHG | HEART RATE: 70 BPM

## 2018-01-26 VITALS
SYSTOLIC BLOOD PRESSURE: 120 MMHG | BODY MASS INDEX: 40.26 KG/M2 | HEIGHT: 71 IN | WEIGHT: 287.6 LBS | DIASTOLIC BLOOD PRESSURE: 82 MMHG | HEART RATE: 72 BPM | RESPIRATION RATE: 16 BRPM

## 2018-01-27 VITALS
WEIGHT: 259.2 LBS | BODY MASS INDEX: 36.29 KG/M2 | DIASTOLIC BLOOD PRESSURE: 100 MMHG | HEIGHT: 71 IN | HEART RATE: 76 BPM | SYSTOLIC BLOOD PRESSURE: 144 MMHG

## 2018-02-01 ASSESSMENT — PATIENT HEALTH QUESTIONNAIRE - PHQ9
SUM OF ALL RESPONSES TO PHQ QUESTIONS 1-9: 14
SUM OF ALL RESPONSES TO PHQ QUESTIONS 1-9: 14
SUM OF ALL RESPONSES TO PHQ QUESTIONS 1-9: 9

## 2018-02-01 ASSESSMENT — ANXIETY QUESTIONNAIRES: GAD7 TOTAL SCORE: 14

## 2018-02-09 VITALS
HEART RATE: 96 BPM | SYSTOLIC BLOOD PRESSURE: 140 MMHG | HEIGHT: 71 IN | DIASTOLIC BLOOD PRESSURE: 96 MMHG | BODY MASS INDEX: 38.5 KG/M2 | TEMPERATURE: 98.1 F | WEIGHT: 275 LBS

## 2018-02-10 ASSESSMENT — PATIENT HEALTH QUESTIONNAIRE - PHQ9: SUM OF ALL RESPONSES TO PHQ QUESTIONS 1-9: 22

## 2018-02-12 NOTE — NURSING NOTE
Patient Information     Patient Name MRN Sex Concha Choi [id number] Male 1975      Nursing Note by Kathryn Jc at 1/3/2018  4:15 PM     Author:  Kathryn Jc Service:  (none) Author Type:  (none)     Filed:  1/3/2018  4:38 PM Encounter Date:  1/3/2018 Status:  Signed     :  Kathryn Jc            Patient presents to clinic for itchy rash all over body after the temperature dropped.  Kathryn Jc LPN ....................  1/3/2018   4:31 PM

## 2018-02-12 NOTE — PROGRESS NOTES
Patient Information     Patient Name MRN Concha Cardenas [id number] Male 1975      Progress Notes by Roberto Wilcox MD at 1/3/2018  4:15 PM     Author:  Roberto Wilcox MD Service:  (none) Author Type:  Physician     Filed:  1/3/2018  5:08 PM Encounter Date:  1/3/2018 Status:  Signed     :  Roberto Wilcox MD (Physician)            Subjective  Concha Shin is a 42 y.o. male who presents for multiple concerns. He has a rash on his legs. He gets extremely itchy all over the legs when he's been outside in the cold for a little while. It resolves when going inside. He can't resist scratching at it. It goes on as fast as it goes off. He also stopped taking all of his medication. This was in 2017. He recently relapsed with methamphetamines on New 's. He's been feeling more and more depressed. Denies suicidal ideation. He feels like his memory is a major problem for him. Making it difficult for him to focus on taking care of himself. He had been seeing mental health providers at [facility] including a med manager and therapist but not recently. He can't remember to schedule aftercare.    Problem List/PMH: reviewed in EMR, and made relevant updates today.  Medications: reviewed in EMR, and made relevant updates today.  Allergies: reviewed in EMR, and made relevant updates today.    Social Hx:  Social History      Substance Use Topics        Smoking status:  Current Every Day Smoker     Packs/day: 0.25     Years: 20.00     Types: Cigarettes     Smokeless tobacco:  Never Used     Alcohol use  No     Social History Narrative    Worked at [employer] End of May 2013.     Single    1 son    Lives with son, girlfriend, and his Aunt in [facility].     Previous health care through [facility] or [place].     Lost drivers license at about 21 years old.       I reviewed social history and made relevant updates today.    Family Hx:   Family  "History       Problem   Relation Age of Onset     Cancer  Father      Throat Cancer in Family       Diabetes  Father      Hypertension  Father      Hyperlipidemia  Father      Stroke  Father      Diabetes  Sister      Heart Disease  Other      Early FHx of CAD        Diabetes  Maternal Grandmother      Heart Disease  Maternal Grandmother      Diabetes  Maternal Grandfather      Diabetes  Paternal Grandmother      Diabetes  Paternal Grandfather      Blood Disease  No Family History      Cancer-prostate  No Family History      Cancer-colon  No Family History      Thyroid Disease  No Family History        Objective  Vitals: reviewed in EMR.  /96 (Cuff Site: Right Arm, Position: Sitting, Cuff Size: Adult Large)  Pulse 96  Temp 98.1  F (36.7  C) (Tympanic)   Ht 1.803 m (5' 11\")  Wt 124.7 kg (275 lb)  BMI 38.35 kg/m2    Gen: Pleasant male, NAD.  HEENT: MMM  Neck: Supple  Pulm: Breathing easily  Neuro: Grossly intact  Skin: Multiple excoriations throughout the legs bilaterally including some oozing of the right lateral calf without erythema  Psychiatric: Flat affect. Seems depressed    Diabetes Labs  Lab Results        Component    Value Date/Time    HGBA1C   7.8 (H) 09/08/2017 08:17 AM    HGBA1C   8.6 (H) 06/03/2013 09:10 AM    CHOL   147 09/08/2017 08:17 AM    HDL   31 09/08/2017 08:17 AM    LDLCHOL   71 09/08/2017 08:17 AM    TRIGLYCERIDE   227 (H) 09/08/2017 08:17 AM    MICROALBRAND    09/22/2015 10:33 AM      Comment:        Urine Albumin below measurement range, unable to calculate      CREATININE   0.93 09/08/2017 08:17 AM       PHQ Depression Screening 9/28/2017 1/3/2018   Date of PHQ exam (doc flow) 9/28/2017 1/3/2018   1. Lack of interest/pleasure 1 - Several days 3 - Nearly every day   2. Feeling down/depressed 1 - Several days 3 - Nearly every day   PHQ-2 TOTAL SCORE 2 6   3. Trouble sleeping 0 - Not at all 1 - Several days   4. Decreased energy 0 - Not at all 3 - Nearly every day   5. Appetite " change 0 - Not at all 3 - Nearly every day   6. Feelings of failure 3 - Nearly every day 3 - Nearly every day   7. Trouble concentrating 3 - Nearly every day 3 - Nearly every day   8. Activity level 1 - Several days 3 - Nearly every day   9. Hurting yourself 0 - Not at all 0 - Not at all   PHQ-9 TOTAL SCORE 9 22   PHQ-9 Severity Level mild severe   Functional Impairment somewhat difficult extremely difficult   Some recent data might be hidden       MAGALYS-7 ANXIETY SCREENING 6/23/2017   MAGALYS date (doc flow) 6/23/2017   Nervous, anxious 3   Cannot stop worrying 2   Worry about different things 2   Cannot relax 1   Feeling restless 2   Easily annoyed/irritated 2   Afraid of awful event 2   Score 14   Severity moderate anxiety   Some recent data might be hidden            Assessment    ICD-10-CM    1. Rash and nonspecific skin eruption R21 mupirocin 2% topical (BACTROBAN OINTMENT) ointment      white petrolatum-mineral oil (EUCERIN) cream   2. Excoriation T14.8XXA mupirocin 2% topical (BACTROBAN OINTMENT) ointment      white petrolatum-mineral oil (EUCERIN) cream   3. Controlled type 2 diabetes mellitus without complication, with long-term current use of insulin (HC) E11.9 insulin glargine (LANTUS SOLOSTAR) 100 unit/mL (3 mL) pen     Z79.4 insulin aspart (NOVOLOG FLEXPEN) 100 unit/mL solution for injection   4. Severe episode of recurrent major depressive disorder, without psychotic features (HC) F33.2    5. Methamphetamine abuse F15.10        Plan   -- Expected clinical course discussed   -- Medications and their side effects discussed  Patient Instructions    -- Restart Wellbutrin   -- Hold off on other depression meds for now   -- Schedule med management at [facility]   -- Schedule counseling at [facility]   -- Call 211 or 911 for mental health emergency   -- Schedule aftercare classes   -- Consider attending AA/NA   -- Daily exercise   -- Eat healthy   -- Don't sleep too much     -- Mupirocin ointment   -- Topical  moisturizer all over     -- Restart your insulin   -- Keep a sugar log book   -- Follow-up in 1 week    Return in about 1 week (around 1/10/2018) for Medication Management.    Signed, Roberto Wilcox MD  Internal Medicine & Pediatrics

## 2018-02-12 NOTE — PATIENT INSTRUCTIONS
Patient Information     Patient Name MRN Concha Cardenas [id number] Male 1975      Patient Instructions by Roberto Wilcox MD at 1/3/2018  4:15 PM     Author:  Roberto Wilcox MD  Service:  (none) Author Type:  Physician     Filed:  1/3/2018  4:53 PM  Encounter Date:  1/3/2018 Status:  Addendum     :  Roberto Wilcox MD (Physician)        Related Notes: Original Note by Roberto Wilcox MD (Physician) filed at 1/3/2018  4:52 PM             -- Restart Wellbutrin   -- Hold off on other depression meds for now   -- Schedule med management at [facility]   -- Schedule counseling at [facility]   -- Call 211 or 911 for mental health emergency   -- Schedule aftercare classes   -- Consider attending AA/NA   -- Daily exercise   -- Eat healthy   -- Don't sleep too much     -- Mupirocin ointment   -- Topical moisturizer all over     -- Restart your insulin   -- Keep a sugar log book   -- Follow-up in 1 week

## 2018-07-12 ENCOUNTER — HOSPITAL ENCOUNTER (EMERGENCY)
Facility: OTHER | Age: 43
Discharge: HOME | End: 2018-07-12
Attending: FAMILY MEDICINE | Admitting: FAMILY MEDICINE

## 2018-07-12 VITALS
HEIGHT: 71 IN | BODY MASS INDEX: 35 KG/M2 | TEMPERATURE: 98.2 F | SYSTOLIC BLOOD PRESSURE: 149 MMHG | DIASTOLIC BLOOD PRESSURE: 106 MMHG | OXYGEN SATURATION: 98 % | WEIGHT: 250 LBS | HEART RATE: 109 BPM | RESPIRATION RATE: 20 BRPM

## 2018-07-12 DIAGNOSIS — L08.9 PUSTULE: ICD-10-CM

## 2018-07-12 PROCEDURE — 99283 EMERGENCY DEPT VISIT LOW MDM: CPT | Mod: Z6 | Performed by: FAMILY MEDICINE

## 2018-07-12 PROCEDURE — 99283 EMERGENCY DEPT VISIT LOW MDM: CPT | Performed by: FAMILY MEDICINE

## 2018-07-12 RX ORDER — CEPHALEXIN 500 MG/1
500 CAPSULE ORAL 4 TIMES DAILY
Qty: 28 CAPSULE | Refills: 0 | Status: SHIPPED | OUTPATIENT
Start: 2018-07-12 | End: 2018-07-19

## 2018-07-12 NOTE — ED AVS SNAPSHOT
[facility]    [address]    [place] [address]    Phone:  [phone]    Fax:  [phone]                                       Concha Shin   MRN: [id number]    Department:  [facility]   Date of Visit:  7/12/2018           After Visit Summary Signature Page     I have received my discharge instructions, and my questions have been answered. I have discussed any challenges I see with this plan with the nurse or doctor.    ..........................................................................................................................................  Patient/Patient Representative Signature      ..........................................................................................................................................  Patient Representative Print Name and Relationship to Patient    ..................................................               ................................................  Date                                            Time    ..........................................................................................................................................  Reviewed by Signature/Title    ...................................................              ..............................................  Date                                                            Time

## 2018-07-12 NOTE — ED PROVIDER NOTES
History   No chief complaint on file.    HPI  Concha Shin is a 42 year old male who resents to the ED with red sore lump on the back of his right thigh.  No fevers or chills.  Nurse's notes below reviewed, similar history was related to me.  No history of previous recurrent abscesses, history of type 2 diabetes and history of methamphetamine abuse.  No known history of MRSA that he can recall.       Pt to ER with c/o lump on back of right thigh, near buttock, just noticed it last night.  States painful 7-8/10, pain when sits down.                         Problem List:    Patient Active Problem List    Diagnosis Date Noted     Hypertension 01/25/2018     Priority: Medium     Controlled type 2 diabetes mellitus without complication, with long-term current use of insulin (H) 09/11/2017     Priority: Medium     History of traumatic brain injury 09/11/2017     Priority: Medium     Methamphetamine abuse 03/31/2017     Priority: Medium     History of drug overdose 05/13/2016     Priority: Medium     Substance abuse 05/13/2016     Priority: Medium     Overview:   Including snorting wellbutrin  Roberto Wilcox MD ....................  5/13/2016   1:36 PM       Hypothyroidism 09/24/2015     Priority: Medium     MDD (major depressive disorder) 04/13/2015     Priority: Medium     Narcotic dependence (H) 04/13/2015     Priority: Medium     Overview:   In treatment at [facility]       Obesity 04/13/2015     Priority: Medium     Opioid dependence (H) 08/19/2013     Priority: Medium     Hyperlipidemia 08/09/2013     Priority: Medium     Major depression 06/03/2013     Priority: Medium     Lumbar disc disease with radiculopathy 05/29/2013     Priority: Medium     Tobacco use 05/29/2013     Priority: Medium     Lumbago 02/21/2011     Priority: Medium     Lateral epicondylitis 02/21/2011     Priority: Medium        Past Medical History:    Past Medical History:   Diagnosis Date     Essential (primary) hypertension       Hypothyroidism      Opioid type dependence, abuse (H)      Type 2 diabetes mellitus without complications (H)        Past Surgical History:    Past Surgical History:   Procedure Laterality Date     ARTHROSCOPY KNEE      2011,Left       Family History:    Family History   Problem Relation Age of Onset     Cancer Father      Cancer,Throat Cancer in Family     Diabetes Father      Diabetes     Hypertension Father      Hypertension     Hyperlipidemia Father      Hyperlipidemia     Other - See Comments Father      Stroke     Diabetes Sister      Diabetes     HEART DISEASE Other      Heart Disease,Early FHx of CAD     Diabetes Maternal Grandmother      Diabetes     HEART DISEASE Maternal Grandmother      Heart Disease     Diabetes Maternal Grandfather      Diabetes     Diabetes Paternal Grandmother      Diabetes     Diabetes Paternal Grandfather      Diabetes     Blood Disease No family hx of      Blood Disease     Prostate Cancer No family hx of      Cancer-prostate     Colon Cancer No family hx of      Cancer-colon     Thyroid Disease No family hx of      Thyroid Disease       Social History:  Marital Status:  Single [1]  Social History   Substance Use Topics     Smoking status: Current Every Day Smoker     Packs/day: 1.00     Years: 20.00     Types: Cigarettes     Smokeless tobacco: Never Used     Alcohol use Yes      Comment: rare        Medications:      cephALEXin (KEFLEX) 500 MG capsule   acetaminophen (TYLENOL) 500 MG tablet   aspirin EC 81 MG EC tablet   atomoxetine (STRATTERA) 60 MG capsule   blood glucose monitoring (ACCU-CHEK FASTCLIX) lancets   blood glucose monitoring (NO BRAND SPECIFIED) test strip   Blood Glucose Monitoring Suppl (FIFTY50 GLUCOSE METER 2.0) W/DEVICE KIT   buPROPion (WELLBUTRIN SR) 150 MG 12 hr tablet   diclofenac (VOLTAREN) 1 % GEL topical gel   FLUoxetine (PROZAC) 20 MG capsule   ibuprofen (ADVIL/MOTRIN) 200 MG tablet   insulin aspart (NOVOLOG PEN) 100 UNIT/ML injection   insulin glargine  "(LANTUS) 100 UNIT/ML injection   Insulin Pen Needle (PEN NEEDLES) 32G X 4 MM MISC   lamoTRIgine (LAMICTAL) 100 MG tablet   losartan (COZAAR) 50 MG tablet   mupirocin (BACTROBAN) 2 % ointment   nicotine polacrilex (NICORETTE) 2 MG gum   sildenafil (REVATIO) 20 MG tablet   simvastatin (ZOCOR) 20 MG tablet   Skin Protectants, Misc. (EUCERIN) cream   triamcinolone (KENALOG) 0.1 % ointment         Review of Systems  No fevers chills or shakes no nausea vomiting or diarrhea.  No chest pain or abdominal pain.  Physical Exam   BP: (!) 149/106  Pulse: 109  Temp: 98.2  F (36.8  C)  Resp: 20  Height: 180.3 cm (5' 11\")  Weight: 113.4 kg (250 lb)  SpO2: 98 %      Physical Exam  Oriented ×3 cranial nerves intact lungs clear, skin structures posterior right thigh minimal area of erythema and induration with a central pustule.  No syd abscess formation.  ED Course     ED Course     Procedures             No results found for this or any previous visit (from the past 24 hour(s)).    Medications - No data to display    Assessments & Plan (with Medical Decision Making)       Discharge Medication List as of 7/12/2018  2:27 PM      START taking these medications    Details   cephALEXin (KEFLEX) 500 MG capsule Take 1 capsule (500 mg) by mouth 4 times daily for 7 days, Disp-28 capsule, R-0, E-Prescribe             Final diagnoses:   Pustule     Trial of Keflex, no ID indicated at this time.  Patient verbalized understanding plan is in agreement.  Return to the ER with worsening symptoms.  Follow-up with primary care in 3-5 days if not improving.  Patient is in agreement with plan.  7/12/2018   [facility] AND [facility]     Moises Peña MD  07/12/18 1442    "

## 2018-07-12 NOTE — ED AVS SNAPSHOT
[facility]    [address]    [place] [address]    Phone:  [phone]    Fax:  [phone]                                       Concha Shin   MRN: [id number]    Department:  [facility]   Date of Visit:  7/12/2018           Patient Information     Date Of Birth          1975        Your diagnoses for this visit were:     Pustule        You were seen by Moises Peña MD.      Follow-up Information     Follow up with Roberto Wilcox MD.    Specialty:  Pediatrics    Why:  As needed    Contact information:    [address]  [facility] [address] [phone]          Follow up with [facility].    Specialty:  EMERGENCY MEDICINE    Why:  If symptoms worsen    Contact information:    [address]  [facility] [address] [phone]      24 Hour Appointment Hotline     To schedule an appointment at [facility], please call [phone]. If you don't have a family doctor or clinic, we will help you find one. [facility] clinics are conveniently located to serve the needs of you and your family.           Review of your medicines      START taking        Dose / Directions Last dose taken    cephALEXin 500 MG capsule   Commonly known as:  KEFLEX   Dose:  500 mg   Quantity:  28 capsule        Take 1 capsule (500 mg) by mouth 4 times daily for 7 days   Refills:  0          Our records show that you are taking the medicines listed below. If these are incorrect, please call your family doctor or clinic.        Dose / Directions Last dose taken    acetaminophen 500 MG tablet   Commonly known as:  TYLENOL   Dose:  1000 mg        Take 1,000 mg by mouth 3 times daily as needed for pain Max acetaminophen dose 4000 mg in 24 hrs   Refills:  0        aspirin 81 MG EC tablet   Dose:  81 mg        Take 81 mg by mouth daily with food   Refills:  0        blood glucose monitoring lancets        Check blood sugar 4  times daily .02   Refills:  0        blood glucose monitoring test strip   Commonly known as:  no brand specified        Check blood sugar 4 times daily, Reason: uncontrolled diabetes E11.65   Refills:  0        buPROPion 150 MG 12 hr tablet   Commonly known as:  WELLBUTRIN SR        150 mg daily x 7 days then 150 mg BID   Refills:  0        diclofenac 1 % Gel topical gel   Commonly known as:  VOLTAREN        2 gm to low back BID PRN pain   Refills:  0        eucerin cream        Apply liberally all over body BID   Refills:  0        FIFTY50 GLUCOSE METER 2.0 w/Device Kit        Dispense item covered by pt ins. Glucose meter and testing suppies   Refills:  0        FLUoxetine 20 MG capsule   Commonly known as:  PROzac   Dose:  20 mg        Take 20 mg by mouth every morning   Refills:  0        ibuprofen 200 MG tablet   Commonly known as:  ADVIL/MOTRIN   Dose:  600 mg        Take 600 mg by mouth every 6 hours as needed for pain   Refills:  0        insulin aspart 100 UNIT/ML injection   Commonly known as:  NovoLOG PEN        Inject 22 Units subcutaneous 3 times daily before meals. +correction scale. Max daily dose 80 units.   Refills:  0        insulin glargine 100 UNIT/ML injection   Commonly known as:  LANTUS        Inject 50 Units subcutaneous before bedtime. Max daily dose 75 units.   Refills:  0        lamoTRIgine 100 MG tablet   Commonly known as:  LaMICtal   Dose:  100 mg        Take 100 mg by mouth daily   Refills:  0        losartan 50 MG tablet   Commonly known as:  COZAAR   Dose:  50 mg        Take 50 mg by mouth daily   Refills:  0        mupirocin 2 % ointment   Commonly known as:  BACTROBAN        Apply twice daily until rash is gone   Refills:  0        nicotine polacrilex 2 MG gum   Commonly known as:  NICORETTE        Take 1 Each by mouth every hour while awake as needed for Nicotine Craving.   Refills:  0        Pen Needles 32G X 4 MM Misc        As directed. For administering insulin at home  ICD E11.9   Refills:  0        sildenafil 20 MG tablet   Commonly known as:  REVATIO        Take 3-5 tablets 1 hour prior to sexual activity   Refills:  0        simvastatin 20 MG tablet   Commonly known as:  ZOCOR   Dose:  20 mg        Take 20 mg by mouth At Bedtime   Refills:  0        STRATTERA 60 MG capsule   Dose:  60 mg   Generic drug:  atomoxetine        Take 60 mg by mouth daily   Refills:  0        triamcinolone 0.1 % ointment   Commonly known as:  KENALOG        Apply topically 3 times daily   Refills:  0                Prescriptions were sent or printed at these locations (1 Prescription)                   [id number] - [place], [place] - [address]   [address] [address]    Telephone:  [phone]   Fax:  [phone]   Hours:                  E-Prescribed (1 of 1)         cephALEXin (KEFLEX) 500 MG capsule                Orders Needing Specimen Collection     None      Pending Results     No orders found from 7/10/2018 to 7/13/2018.            Pending Culture Results     No orders found from 7/10/2018 to 7/13/2018.            Pending Results Instructions     If you had any lab results that were not finalized at the time of your Discharge, you can call the ED Lab Result RN at [phone]. You will be contacted by this team for any positive Lab results or changes in treatment. The nurses are available 7 days a week from 10A to 6:30P.  You can leave a message 24 hours per day and they will return your call.        Thank you for choosing [facility]       Thank you for choosing [facility] for your care. Our goal is always to provide you with excellent care. Hearing back from our patients is one way we can continue to improve our services. Please take a few minutes to complete the written survey that you may receive in the mail after you visit with us. Thank you!        [facility] Information     [facility] lets you send messages to your doctor, view your test  "results, renew your prescriptions, schedule appointments and more. To sign up, go to [email or web address] . Click on \"Log in\" on the left side of the screen, which will take you to the Welcome page. Then click on \"Sign up Now\" on the right side of the page.     You will be asked to enter the access code listed below, as well as some personal information. Please follow the directions to create your username and password.     Your access code is: [email or web address]  Expires: 10/10/2018  2:27 PM     Your access code will  in 90 days. If you need help or a new code, please call your [facility] clinic or [phone].        Care EveryWhere ID     This is your Care EveryWhere ID. This could be used by other organizations to access your [facility] medical records  [id number]        Equal Access to Services     JUNIOR CARSON : Hadstar Dowd, lonnie al, luz diggsalcoty helton, rafal witt . So [facility] [phone].    ATENCIÓN: Si habla español, tiene a mckay disposición servicios gratuitos de asistencia lingüística. Llame al [phone].    We comply with applicable federal civil rights laws and [place] laws. We do not discriminate on the basis of race, color, national origin, age, disability, sex, sexual orientation, or gender identity.            After Visit Summary       This is your record. Keep this with you and show to your community pharmacist(s) and doctor(s) at your next visit.                  "

## 2018-07-12 NOTE — ED TRIAGE NOTES
Pt to ER with c/o lump on back of right thigh, near buttock, just noticed it last night.  States painful 7-8/10, pain when sits down.

## 2018-07-23 NOTE — PROGRESS NOTES
Patient Information     Patient Name  Concha Shin MRN  [id number] Sex  Male   1975      Letter by Roberto Wilcox MD at      Author:  Roberto Wilcox MD Service:  (none) Author Type:  (none)    Filed:   Encounter Date:  2017 Status:  (Other)           Concha Shin   [address]  [place] [address]          2017      To whomever it may concern:    Concha Shin was seen in my clinic today 2017. Please allow him to sit while working.    Roberto Nugent MD  Internal Medicine & Pediatrics

## 2018-07-23 NOTE — PROGRESS NOTES
Patient Information     Patient Name  Concha Shin MRN  [id number] Sex  Male   1975      Letter by Chidi Wetzel MD at      Author:  Chidi Wetzel MD Service:  (none) Author Type:  (none)    Filed:   Encounter Date:  3/31/2017 Status:  (Other)           Concha Shin  [address]   [place] [address]          2017    Dear Mr. Shin:    Enclosed is a copy of your A1c and as you can see her diabetes is not under good control. Hopefully taking insulin on a regular basis will help it. I would recommend in one month to bring all your blood sugars in and have him reviewed by her primary doctor.  insulin can be adjusted accordingly.  Results for orders placed or performed in visit on 17       TSH       Result  Value Ref Range Status    TSH 3.59 0.34 - 5.60 uIU/mL Final   Hgb A1c       Result  Value Ref Range Status    HEMOGLOBIN A1C MONITORING (POCT) 9.4 (H) 4.0 - 6.2 % Final    ESTIMATED AVERAGE GLUCOSE  223 mg/dL Final   BASIC METABOLIC PANEL       Result  Value Ref Range Status    SODIUM 137 133 - 143 mmol/L Final    POTASSIUM 4.1 3.5 - 5.1 mmol/L Final    CHLORIDE 102 98 - 107 mmol/L Final    CO2,TOTAL 26 21 - 31 mmol/L Final    ANION GAP 9 5 - 18                 Final    GLUCOSE 264 (H) 70 - 105 mg/dL Final    CALCIUM 9.1 8.6 - 10.3 mg/dL Final    BUN 14 7 - 25 mg/dL Final    CREATININE 0.87 0.70 - 1.30 mg/dL Final    BUN/CREAT RATIO           16                 Final    GFR if African American >60 >60 ml/min/1.73m2 Final    GFR if not African American >60 >60 ml/min/1.73m2 Final     Chidi Wetzel MD ....................  2017   1:25 PM

## 2018-09-04 DIAGNOSIS — Z79.4 CONTROLLED TYPE 2 DIABETES MELLITUS WITHOUT COMPLICATION, WITH LONG-TERM CURRENT USE OF INSULIN (H): Primary | ICD-10-CM

## 2018-09-04 DIAGNOSIS — E11.9 CONTROLLED TYPE 2 DIABETES MELLITUS WITHOUT COMPLICATION, WITH LONG-TERM CURRENT USE OF INSULIN (H): Primary | ICD-10-CM

## 2018-09-04 RX ORDER — INSULIN GLARGINE 100 [IU]/ML
INJECTION, SOLUTION SUBCUTANEOUS
Qty: 30 ML | Refills: 1 | Status: SHIPPED | OUTPATIENT
Start: 2018-09-04

## 2018-09-04 NOTE — LETTER
September 4, 2018        Concha TELLEZ Shin  [address] [address]        Dear Mr. Shin,    Your pharmacy has requested a refill of Basaglar insulin.  This medication request is being addressed.     According to our records, you are overdue for annual medication management and labs with Dr. Roberto Wilcox. Your health is very important to us.     Please contact our scheduling line at [phone] to set up this appointment at your earliest convenience, and before your next medication refills are needed.     Thank you for choosing [facility] for your health care needs.     Sincerely,        [facility]es  [facility]

## 2018-09-05 DIAGNOSIS — E11.9 CONTROLLED TYPE 2 DIABETES MELLITUS WITHOUT COMPLICATION, WITH LONG-TERM CURRENT USE OF INSULIN (H): ICD-10-CM

## 2018-09-05 DIAGNOSIS — Z79.4 CONTROLLED TYPE 2 DIABETES MELLITUS WITHOUT COMPLICATION, WITH LONG-TERM CURRENT USE OF INSULIN (H): ICD-10-CM

## 2018-09-07 RX ORDER — INSULIN GLARGINE 100 [IU]/ML
INJECTION, SOLUTION SUBCUTANEOUS
Qty: 5 ML | OUTPATIENT
Start: 2018-09-07

## 2018-09-07 NOTE — TELEPHONE ENCOUNTER
BASAGLAR [id number]      [facility] is requesting med, however med was recently filled through [facility]. Writer will be rejecting refill request at this time.  Medication Detail      Disp Refills Start End RUMA   BASAGLAR 100 UNIT/ML injection 30 mL 1 9/4/2018  --   Sig: Inject 50 Units subcutaneous before bedtime. Max daily dose 75 units.   Class: E-Prescribe   Order: [id number]   E-Prescribing Status: Receipt confirmed by pharmacy (9/4/2018  4:40 PM CDT)       Judith Oates RN on 9/7/2018 at 4:26 PM

## 2025-07-17 PROCEDURE — 99499 UNLISTED E&M SERVICE: CPT | Performed by: CHIROPRACTOR
